# Patient Record
Sex: FEMALE | Race: OTHER | HISPANIC OR LATINO | Employment: UNEMPLOYED | URBAN - METROPOLITAN AREA
[De-identification: names, ages, dates, MRNs, and addresses within clinical notes are randomized per-mention and may not be internally consistent; named-entity substitution may affect disease eponyms.]

---
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
## 2022-01-09 ENCOUNTER — OFFICE VISIT (OUTPATIENT)
Dept: URGENT CARE | Age: 22
End: 2022-01-09
Payer: MEDICAID

## 2022-01-09 VITALS
DIASTOLIC BLOOD PRESSURE: 62 MMHG | HEART RATE: 92 BPM | RESPIRATION RATE: 18 BRPM | SYSTOLIC BLOOD PRESSURE: 132 MMHG | TEMPERATURE: 97.2 F | OXYGEN SATURATION: 99 %

## 2022-01-09 DIAGNOSIS — R31.9 URINARY TRACT INFECTION WITH HEMATURIA, SITE UNSPECIFIED: Primary | ICD-10-CM

## 2022-01-09 DIAGNOSIS — N39.0 URINARY TRACT INFECTION WITH HEMATURIA, SITE UNSPECIFIED: Primary | ICD-10-CM

## 2022-01-09 LAB
SL AMB  POCT GLUCOSE, UA: NEGATIVE
SL AMB LEUKOCYTE ESTERASE,UA: ABNORMAL
SL AMB POCT BILIRUBIN,UA: NEGATIVE
SL AMB POCT BLOOD,UA: ABNORMAL
SL AMB POCT CLARITY,UA: ABNORMAL
SL AMB POCT COLOR,UA: YELLOW
SL AMB POCT KETONES,UA: NEGATIVE
SL AMB POCT NITRITE,UA: NEGATIVE
SL AMB POCT PH,UA: 6
SL AMB POCT SPECIFIC GRAVITY,UA: 1.01
SL AMB POCT URINE PROTEIN: NEGATIVE
SL AMB POCT UROBILINOGEN: 0.2

## 2022-01-09 PROCEDURE — 87186 SC STD MICRODIL/AGAR DIL: CPT | Performed by: PHYSICIAN ASSISTANT

## 2022-01-09 PROCEDURE — 81002 URINALYSIS NONAUTO W/O SCOPE: CPT | Performed by: PHYSICIAN ASSISTANT

## 2022-01-09 PROCEDURE — 87077 CULTURE AEROBIC IDENTIFY: CPT | Performed by: PHYSICIAN ASSISTANT

## 2022-01-09 PROCEDURE — 99213 OFFICE O/P EST LOW 20 MIN: CPT | Performed by: PHYSICIAN ASSISTANT

## 2022-01-09 PROCEDURE — 87086 URINE CULTURE/COLONY COUNT: CPT | Performed by: PHYSICIAN ASSISTANT

## 2022-01-09 RX ORDER — SULFAMETHOXAZOLE AND TRIMETHOPRIM 800; 160 MG/1; MG/1
1 TABLET ORAL EVERY 12 HOURS SCHEDULED
Qty: 10 TABLET | Refills: 0 | Status: SHIPPED | OUTPATIENT
Start: 2022-01-09 | End: 2022-01-14

## 2022-01-09 NOTE — PROGRESS NOTES
[facility]        NAME: Mariama Gifford is a 25 y o  female  : 2000    MRN: [id number]  DATE: 2022  TIME: 4:58 PM    Assessment and Plan   Urinary tract infection with hematuria, site unspecified [N39 0, R31 9]  1  Urinary tract infection with hematuria, site unspecified  POCT urine dip    Urine culture    sulfamethoxazole-trimethoprim (BACTRIM DS) 800-160 mg per tablet         Patient Instructions       Follow up with PCP in 3-5 days  Proceed to  ER if symptoms worsen  Chief Complaint     Chief Complaint   Patient presents with    Possible UTI     c/o burning with urination, and frequency          History of Present Illness       For evaluation of urinary burning, pressure, frequency  Review of Systems   Review of Systems   Constitutional: Negative  Gastrointestinal: Negative  Genitourinary: Positive for dysuria, frequency and urgency  Negative for decreased urine volume, difficulty urinating, flank pain, hematuria, pelvic pain and vaginal pain           Current Medications       Current Outpatient Medications:     docusate sodium (COLACE) 100 mg capsule, Take 1 capsule (100 mg total) by mouth 2 (two) times a day (Patient not taking: Reported on 10/29/2021), Disp: 60 capsule, Rfl: 0    etonogestrel (Nexplanon) subdermal implant, 68 mg by Subdermal route once, Disp: , Rfl:     Glycerin, Laxative, ([employer] Glycerin Adult) 2 1 g SUPP, Insert 1 suppository (2 1 g total) into the rectum daily as needed (constipation) (Patient not taking: Reported on 10/29/2021), Disp: 25 suppository, Rfl: 0    norethindrone-ethinyl estradiol-iron (MICROGESTIN FE1 ) 1 5-30 MG-MCG tablet, Take 1 tablet by mouth daily for 28 days, Disp: 28 tablet, Rfl: 1    polyethylene glycol (GLYCOLAX) 17 GM/SCOOP powder, Take 17 g by mouth daily (Patient not taking: Reported on 10/29/2021), Disp: 576 g, Rfl: 0    sulfamethoxazole-trimethoprim (BACTRIM DS) 800-160 mg per tablet, Take 1 tablet by mouth every 12 (twelve) hours for 5 days, Disp: 10 tablet, Rfl: 0    Current Allergies     Allergies as of 01/09/2022    (No Known Allergies)            The following portions of the patient's history were reviewed and updated as appropriate: allergies, current medications, past family history, past medical history, past social history, past surgical history and problem list      History reviewed  No pertinent past medical history  Past Surgical History:   Procedure Laterality Date    WISDOM TOOTH EXTRACTION         Family History   Problem Relation Age of Onset    Diabetes Mother     No Known Problems Father          Medications have been verified  Objective   /62   Pulse 92   Temp (!) 97 2 °F (36 2 °C)   Resp 18   LMP 12/25/2021 (Approximate)   SpO2 99%   Patient's last menstrual period was 12/25/2021 (approximate)  Physical Exam     Physical Exam  Vitals and nursing note reviewed  Constitutional:       General: She is not in acute distress  Appearance: She is well-developed  She is not diaphoretic  HENT:      Head: Normocephalic and atraumatic  Abdominal:      General: Bowel sounds are normal  There is no distension  Palpations: Abdomen is soft  There is no mass  Tenderness: There is no abdominal tenderness  There is no right CVA tenderness, left CVA tenderness, guarding or rebound  Skin:     General: Skin is warm and dry  Findings: No rash  Neurological:      Mental Status: She is alert and oriented to person, place, and time  Psychiatric:         Behavior: Behavior normal          Thought Content:  Thought content normal          Judgment: Judgment normal

## 2022-01-09 NOTE — PATIENT INSTRUCTIONS
1  Drink plenty fluids  2   Take probiotics [i e  Yogurt, Acidophilus, Florastor (liquid)] daily  3   Over-the-counter medications as needed for symptomatic care  4    Advance activities as tolerated  5    Follow-up with your primary care physician in 3-4 days  6   Go to emergency room if symptoms are worsening      7   Use a humidifier at bedtime

## 2022-01-11 LAB — BACTERIA UR CULT: ABNORMAL

## 2022-01-13 ENCOUNTER — TELEPHONE (OUTPATIENT)
Dept: URGENT CARE | Facility: MEDICAL CENTER | Age: 22
End: 2022-01-13

## 2022-01-13 DIAGNOSIS — N39.0 URINARY TRACT INFECTION WITH HEMATURIA, SITE UNSPECIFIED: Primary | ICD-10-CM

## 2022-01-13 DIAGNOSIS — R31.9 URINARY TRACT INFECTION WITH HEMATURIA, SITE UNSPECIFIED: Primary | ICD-10-CM

## 2022-01-13 RX ORDER — NITROFURANTOIN 25; 75 MG/1; MG/1
100 CAPSULE ORAL 2 TIMES DAILY
Qty: 14 CAPSULE | Refills: 0 | Status: SHIPPED | OUTPATIENT
Start: 2022-01-13 | End: 2022-01-20

## 2022-01-14 ENCOUNTER — TELEPHONE (OUTPATIENT)
Dept: URGENT CARE | Facility: MEDICAL CENTER | Age: 22
End: 2022-01-14

## 2022-03-14 ENCOUNTER — OFFICE VISIT (OUTPATIENT)
Dept: URGENT CARE | Age: 22
End: 2022-03-14
Payer: MEDICAID

## 2022-03-14 VITALS — TEMPERATURE: 97.6 F | RESPIRATION RATE: 20 BRPM | HEART RATE: 81 BPM | OXYGEN SATURATION: 97 %

## 2022-03-14 DIAGNOSIS — R05.9 COUGH: Primary | ICD-10-CM

## 2022-03-14 PROCEDURE — 99213 OFFICE O/P EST LOW 20 MIN: CPT

## 2022-03-14 RX ORDER — BROMPHENIRAMINE MALEATE, PSEUDOEPHEDRINE HYDROCHLORIDE, AND DEXTROMETHORPHAN HYDROBROMIDE 2; 30; 10 MG/5ML; MG/5ML; MG/5ML
5 SYRUP ORAL 3 TIMES DAILY PRN
Qty: 120 ML | Refills: 0 | Status: SHIPPED | OUTPATIENT
Start: 2022-03-14 | End: 2022-07-27

## 2022-03-14 RX ORDER — FLUTICASONE PROPIONATE 50 MCG
1 SPRAY, SUSPENSION (ML) NASAL DAILY
Qty: 11.1 ML | Refills: 0 | Status: SHIPPED | OUTPATIENT
Start: 2022-03-14 | End: 2022-07-27

## 2022-03-14 NOTE — LETTER
April 1, 2022     Patient: Jose Trujillo   YOB: 2000   Date of Visit: 3/14/2022       To Whom it May Concern:    Ivon Braden was seen in my clinic on 3/14/2022  She may return to school/work on 3/16/22  If you have any questions or concerns, please don't hesitate to call           Sincerely,          JACOB Miguel        CC: No Recipients

## 2022-03-15 NOTE — PATIENT INSTRUCTIONS
Acute Cough   WHAT YOU NEED TO KNOW:   An acute cough can last up to 3 weeks  Common causes of an acute cough include a cold, allergies, or a lung infection  DISCHARGE INSTRUCTIONS:   Return to the emergency department if:   · You have trouble breathing or feel short of breath  · You cough up blood, or you see blood in your mucus  · You faint or feel weak or dizzy  · You have chest pain when you cough or take a deep breath  · You have new wheezing  Contact your healthcare provider if:   · You have a fever  · Your cough lasts longer than 4 weeks  · Your symptoms do not improve with treatment  · You have questions or concerns about your condition or care  Medicines:   · Medicines  may be needed to stop the cough, decrease swelling in your airways, or help open your airways  Medicine may also be given to help you cough up mucus  Ask your healthcare provider what over-the-counter medicines you can take  If you have an infection caused by bacteria, you may need antibiotics  · Take your medicine as directed  Contact your healthcare provider if you think your medicine is not helping or if you have side effects  Tell him or her if you are allergic to any medicine  Keep a list of the medicines, vitamins, and herbs you take  Include the amounts, and when and why you take them  Bring the list or the pill bottles to follow-up visits  Carry your medicine list with you in case of an emergency  Manage your symptoms:   · Do not smoke and stay away from others who smoke  Nicotine and other chemicals in cigarettes and cigars can cause lung damage and make your cough worse  Ask your healthcare provider for information if you currently smoke and need help to quit  E-cigarettes or smokeless tobacco still contain nicotine  Talk to your healthcare provider before you use these products  · Drink extra liquids as directed  Liquids will help thin and loosen mucus so you can cough it up   Liquids will also help prevent dehydration  Examples of good liquids to drink include water, fruit juice, and broth  Do not drink liquids that contain caffeine  Caffeine can increase your risk for dehydration  Ask your healthcare provider how much liquid to drink each day  · Rest as directed  Do not do activities that make your cough worse, such as exercise  · Use a humidifier or vaporizer  Use a cool mist humidifier or a vaporizer to increase air moisture in your home  This may make it easier for you to breathe and help decrease your cough  · Eat 2 to 5 mL of honey 2 times each day  Honey can help thin mucus and decrease your cough  · Use cough drops or lozenges  These can help decrease throat irritation and your cough  Follow up with your healthcare provider as directed:  Write down your questions so you remember to ask them during your visits  © Copyright [employer] 2022 Information is for End User's use only and may not be sold, redistributed or otherwise used for commercial purposes  All illustrations and images included in CareNotes® are the copyrighted property of A D A M , Inc  or [facility] Jemma Nichols   The above information is an  only  It is not intended as medical advice for individual conditions or treatments  Talk to your doctor, nurse or pharmacist before following any medical regimen to see if it is safe and effective for you

## 2022-03-15 NOTE — PROGRESS NOTES
[facility] Now        NAME: Mariama Gifford is a 25 y o  female  : 2000    MRN: [id number]  DATE: 2022  TIME: 11:08 PM    Assessment and Plan   Cough [R05 9]  1  Cough  fluticasone (FLONASE) 50 mcg/act nasal spray    brompheniramine-pseudoephedrine-DM 30-2-10 MG/5ML syrup   Rapid strep A negative, likely viral etiology for symptoms at this point  Will trial Bromfed and Flonase for symptom relief  Patient Instructions   If fever, SOB, chest pain, proceed to ED for evaluation  Follow up with PCP in 3-5 days  Proceed to  ER if symptoms worsen  Chief Complaint     Chief Complaint   Patient presents with    Cough     chest tightness , symptoms x friday     Nasal Congestion    Back Pain         History of Present Illness       Patient is a 25 y o  female who presents for chief complaint of cough and nasal congestion since this past Friday  PMH is non-contributory, she has tried nothing for her symptoms  In addition, she reports a sore throat which began about two weeks ago  Initially, she had noticed her tonsils had "white stuff on them", and she feels she likely had a strep infection  Symptoms subsided to some degree, until her present symptoms of cough and congestion began  Denies fever, body aches, rash, abdominal pain, N/V/D, dizziness  Cough  Associated symptoms include headaches, postnasal drip and a sore throat  Pertinent negatives include no chest pain, chills, eye redness, fever, rhinorrhea or shortness of breath  There is no history of environmental allergies  Back Pain  Associated symptoms include headaches  Pertinent negatives include no abdominal pain, chest pain, fever or numbness  Review of Systems   Review of Systems   Constitutional: Negative for activity change, appetite change, chills, diaphoresis, fatigue and fever  HENT: Positive for congestion, postnasal drip and sore throat   Negative for rhinorrhea, sinus pressure, sinus pain and sneezing  Eyes: Negative for pain, discharge, redness and itching  Respiratory: Positive for cough  Negative for choking, chest tightness and shortness of breath  Cardiovascular: Negative for chest pain  Gastrointestinal: Positive for nausea  Negative for abdominal pain and diarrhea  Endocrine: Negative  Negative for polydipsia, polyphagia and polyuria  Genitourinary: Negative  Musculoskeletal: Positive for back pain  Negative for arthralgias  Skin: Negative  Allergic/Immunologic: Negative for environmental allergies  Neurological: Positive for headaches  Negative for dizziness, light-headedness and numbness  Hematological: Negative for adenopathy  Psychiatric/Behavioral: Negative            Current Medications       Current Outpatient Medications:     brompheniramine-pseudoephedrine-DM 30-2-10 MG/5ML syrup, Take 5 mL by mouth 3 (three) times a day as needed for congestion, cough or allergies, Disp: 120 mL, Rfl: 0    docusate sodium (COLACE) 100 mg capsule, Take 1 capsule (100 mg total) by mouth 2 (two) times a day (Patient not taking: Reported on 10/29/2021), Disp: 60 capsule, Rfl: 0    etonogestrel (Nexplanon) subdermal implant, 68 mg by Subdermal route once, Disp: , Rfl:     fluticasone (FLONASE) 50 mcg/act nasal spray, 1 spray into each nostril daily, Disp: 11 1 mL, Rfl: 0    Glycerin, Laxative, ([employer] Glycerin Adult) 2 1 g SUPP, Insert 1 suppository (2 1 g total) into the rectum daily as needed (constipation) (Patient not taking: Reported on 10/29/2021), Disp: 25 suppository, Rfl: 0    norethindrone-ethinyl estradiol-iron (MICROGESTIN FE1 5/30) 1 5-30 MG-MCG tablet, Take 1 tablet by mouth daily for 28 days, Disp: 28 tablet, Rfl: 1    polyethylene glycol (GLYCOLAX) 17 GM/SCOOP powder, Take 17 g by mouth daily (Patient not taking: Reported on 10/29/2021), Disp: 576 g, Rfl: 0    Current Allergies     Allergies as of 03/14/2022    (No Known Allergies)            The following portions of the patient's history were reviewed and updated as appropriate: allergies, current medications, past family history, past medical history, past social history, past surgical history and problem list      History reviewed  No pertinent past medical history  Past Surgical History:   Procedure Laterality Date    WISDOM TOOTH EXTRACTION         Family History   Problem Relation Age of Onset    Diabetes Mother     No Known Problems Father          Medications have been verified  Objective   Pulse 81   Temp 97 6 °F (36 4 °C) (Temporal)   Resp 20   LMP 12/25/2021 Comment: bcp  SpO2 97%        Physical Exam     Physical Exam  Vitals and nursing note reviewed  Constitutional:       General: She is not in acute distress  Appearance: Normal appearance  She is not ill-appearing  HENT:      Head: Normocephalic and atraumatic  Right Ear: Tympanic membrane normal       Left Ear: Tympanic membrane normal       Nose: Congestion and rhinorrhea present  Mouth/Throat:      Mouth: Mucous membranes are dry  Pharynx: Oropharynx is clear  Uvula midline  Posterior oropharyngeal erythema present  No pharyngeal swelling, oropharyngeal exudate or uvula swelling  Tonsils: No tonsillar exudate or tonsillar abscesses  3+ on the right  3+ on the left  Eyes:      Extraocular Movements: Extraocular movements intact  Conjunctiva/sclera: Conjunctivae normal       Pupils: Pupils are equal, round, and reactive to light  Cardiovascular:      Rate and Rhythm: Normal rate and regular rhythm  Pulses: Normal pulses  Heart sounds: Normal heart sounds, S1 normal and S2 normal  No murmur heard  No friction rub  No gallop  Pulmonary:      Effort: Pulmonary effort is normal  No tachypnea, bradypnea, accessory muscle usage, prolonged expiration or respiratory distress  Breath sounds: Normal breath sounds and air entry   No stridor, decreased air movement or transmitted upper airway sounds  No decreased breath sounds, wheezing, rhonchi or rales  Abdominal:      General: Bowel sounds are normal       Palpations: Abdomen is soft  Tenderness: There is no abdominal tenderness  There is no guarding or rebound  Musculoskeletal:         General: Normal range of motion  Cervical back: Normal range of motion and neck supple  No tenderness  Skin:     General: Skin is warm and dry  Capillary Refill: Capillary refill takes less than 2 seconds  Neurological:      General: No focal deficit present  Mental Status: She is alert and oriented to person, place, and time  Cranial Nerves: No cranial nerve deficit     Psychiatric:         Mood and Affect: Mood normal          Behavior: Behavior normal

## 2022-03-17 ENCOUNTER — APPOINTMENT (OUTPATIENT)
Dept: URGENT CARE | Facility: CLINIC | Age: 22
End: 2022-03-17

## 2022-03-17 DIAGNOSIS — Z02.1 PRE-EMPLOYMENT HEALTH SCREENING EXAMINATION: Primary | ICD-10-CM

## 2022-03-17 PROCEDURE — 86480 TB TEST CELL IMMUN MEASURE: CPT

## 2022-03-21 LAB
GAMMA INTERFERON BACKGROUND BLD IA-ACNC: 0.08 IU/ML
M TB IFN-G BLD-IMP: NEGATIVE
M TB IFN-G CD4+ BCKGRND COR BLD-ACNC: 0.1 IU/ML
M TB IFN-G CD4+ BCKGRND COR BLD-ACNC: 0.16 IU/ML
MITOGEN IGNF BCKGRD COR BLD-ACNC: 9.79 IU/ML

## 2022-03-29 NOTE — TELEPHONE ENCOUNTER
03/28/22 11:04 PM     Thank you for your request  Your request has been received, reviewed, and the patient chart updated  The PCP has successfully been removed with a patient attribution note  This message will now be completed      Thank you  Eunice Sheehan

## 2022-04-02 ENCOUNTER — OFFICE VISIT (OUTPATIENT)
Dept: URGENT CARE | Age: 22
End: 2022-04-02
Payer: MEDICAID

## 2022-04-02 DIAGNOSIS — T78.40XA ALLERGIC REACTION, INITIAL ENCOUNTER: Primary | ICD-10-CM

## 2022-04-02 PROCEDURE — 99213 OFFICE O/P EST LOW 20 MIN: CPT | Performed by: STUDENT IN AN ORGANIZED HEALTH CARE EDUCATION/TRAINING PROGRAM

## 2022-04-02 RX ORDER — PREDNISONE 10 MG/1
10 TABLET ORAL DAILY
Qty: 21 TABLET | Refills: 0 | Status: SHIPPED | OUTPATIENT
Start: 2022-04-02 | End: 2022-07-27

## 2022-04-02 NOTE — PROGRESS NOTES
[facility]        NAME: Dulce Duran is a 25 y o  female  : 2000    MRN: [id number]  DATE: 2022  TIME: 7:19 PM    Assessment and Plan   Allergic reaction, initial encounter Po Jacobs  1  Allergic reaction, initial encounter  predniSONE 10 mg tablet     Follow-up with ER as your OB doctor is in [facility], by Wednesday no improvement or worsening or any chest pain or shortness of breath    Patient Instructions       Follow up with PCP in 3-5 days  Proceed to  ER if symptoms worsen  Chief Complaint   No chief complaint on file  History of Present Illness       HPI   Patient presents today itching and erythema your surgeon side which she got a  and   The symptoms started on Tuesday she did not think of anything of states that it has gradually been resolving  Patient would just like to make sure it is nothing serious  Patient denies any chest pain shortness of breath pain in her arm      Review of Systems   Review of Systems  Per hpi     Current Medications       Current Outpatient Medications:     brompheniramine-pseudoephedrine-DM 30-2-10 MG/5ML syrup, Take 5 mL by mouth 3 (three) times a day as needed for congestion, cough or allergies, Disp: 120 mL, Rfl: 0    docusate sodium (COLACE) 100 mg capsule, Take 1 capsule (100 mg total) by mouth 2 (two) times a day (Patient not taking: Reported on 10/29/2021), Disp: 60 capsule, Rfl: 0    etonogestrel (Nexplanon) subdermal implant, 68 mg by Subdermal route once, Disp: , Rfl:     fluticasone (FLONASE) 50 mcg/act nasal spray, 1 spray into each nostril daily, Disp: 11 1 mL, Rfl: 0    Glycerin, Laxative, ([employer] Glycerin Adult) 2 1 g SUPP, Insert 1 suppository (2 1 g total) into the rectum daily as needed (constipation) (Patient not taking: Reported on 10/29/2021), Disp: 25 suppository, Rfl: 0    norethindrone-ethinyl estradiol-iron (MICROGESTIN FE1 ) 1 5-30 MG-MCG tablet, Take 1 tablet by mouth daily for 28 days, Disp: 28 tablet, Rfl: 1    polyethylene glycol (GLYCOLAX) 17 GM/SCOOP powder, Take 17 g by mouth daily (Patient not taking: Reported on 10/29/2021), Disp: 576 g, Rfl: 0    predniSONE 10 mg tablet, Take 1 tablet (10 mg total) by mouth daily 6 tab day 1, 5 tab day 2, 4 tab day 3, 3 tab day 4, 2 tab day 5, 1 tab day 6, Disp: 21 tablet, Rfl: 0    Current Allergies     Allergies as of 04/02/2022    (No Known Allergies)            The following portions of the patient's history were reviewed and updated as appropriate: allergies, current medications, past family history, past medical history, past social history, past surgical history and problem list      No past medical history on file  Past Surgical History:   Procedure Laterality Date    WISDOM TOOTH EXTRACTION         Family History   Problem Relation Age of Onset    Diabetes Mother     No Known Problems Father          Medications have been verified  Objective   There were no vitals taken for this visit  No LMP recorded  Physical Exam     Physical Exam  Constitutional:       General: She is not in acute distress  Appearance: Normal appearance  HENT:      Head: Normocephalic  Nose: No congestion or rhinorrhea  Mouth/Throat:      Mouth: Mucous membranes are moist       Pharynx: No oropharyngeal exudate or posterior oropharyngeal erythema  Eyes:      General:         Right eye: No discharge  Left eye: No discharge  Conjunctiva/sclera: Conjunctivae normal    Cardiovascular:      Rate and Rhythm: Normal rate and regular rhythm  Pulses: Normal pulses  Pulmonary:      Effort: Pulmonary effort is normal  No respiratory distress  Abdominal:      General: Abdomen is flat  There is no distension  Palpations: Abdomen is soft  Tenderness: There is no abdominal tenderness  Musculoskeletal:      Cervical back: Neck supple  Skin:     General: Skin is warm        Capillary Refill: Capillary refill takes less than 2 seconds  Findings: Erythema present  Comments: Slight erythema overlying Nexplanon insertion site, resolving compared to picture from 2 days ago  Neurological:      Mental Status: She is alert and oriented to person, place, and time

## 2022-06-20 ENCOUNTER — OFFICE VISIT (OUTPATIENT)
Dept: FAMILY MEDICINE CLINIC | Facility: CLINIC | Age: 22
End: 2022-06-20
Payer: COMMERCIAL

## 2022-06-20 VITALS
RESPIRATION RATE: 16 BRPM | DIASTOLIC BLOOD PRESSURE: 78 MMHG | BODY MASS INDEX: 31.09 KG/M2 | HEART RATE: 86 BPM | WEIGHT: 186.6 LBS | TEMPERATURE: 99.4 F | HEIGHT: 65 IN | SYSTOLIC BLOOD PRESSURE: 120 MMHG | OXYGEN SATURATION: 98 %

## 2022-06-20 DIAGNOSIS — B35.3 TINEA PEDIS OF BOTH FEET: Primary | ICD-10-CM

## 2022-06-20 PROCEDURE — 99214 OFFICE O/P EST MOD 30 MIN: CPT | Performed by: FAMILY MEDICINE

## 2022-06-20 NOTE — PROGRESS NOTES
Assessment/Plan:    No problem-specific Assessment & Plan notes found for this encounter  Diagnoses and all orders for this visit:    Tinea pedis of both feet  Comments:  Continue clotrimazole bid and continue for a week after rash resolved  Return if no improvement in 2-4 weeks  Orders:  -     Clotrimazole 1 % OINT; Apply topically 2 (two) times a day        Subjective: foot fungus     Patient ID: Tiki President is a 25 y o  female  Rash  This is a chronic problem  The current episode started more than 1 month ago  The problem has been gradually worsening since onset  The affected locations include the left foot and right foot  The rash is characterized by redness, itchiness and peeling  She was exposed to nothing  Pertinent negatives include no anorexia, congestion, cough, diarrhea, facial edema, fatigue, fever, joint pain, rhinorrhea, shortness of breath, sore throat or vomiting  Symptoms started a couple months ago with peeling of the bilateral toes in the interdigital webbing  She usually wears constricting shoes  She tried aquafor, a barrier cream and coconut oil  The following portions of the patient's history were reviewed and updated as appropriate: allergies, current medications, past family history, past medical history, past social history, past surgical history and problem list     Review of Systems   Constitutional: Negative for fatigue and fever  HENT: Negative for congestion, rhinorrhea and sore throat  Respiratory: Negative for cough and shortness of breath  Gastrointestinal: Negative for anorexia, diarrhea and vomiting  Musculoskeletal: Negative for joint pain  Skin: Positive for rash           PHQ-2/9 Depression Screening    Little interest or pleasure in doing things: 1 - several days  Feeling down, depressed, or hopeless: 0 - not at all       [unfilled]    Objective:      /78 (BP Location: Left arm, Patient Position: Sitting, Cuff Size: Adult)   Pulse 86   Temp 99 4 °F (37 4 °C) (Tympanic)   Resp 16   Ht 5' 5" (1 651 m)   Wt 84 6 kg (186 lb 9 6 oz)   SpO2 98%   BMI 31 05 kg/m²          Physical Exam  Constitutional:       Appearance: She is well-developed  HENT:      Head: Normocephalic and atraumatic  Right Ear: External ear normal       Left Ear: External ear normal    Eyes:      General: No scleral icterus  Conjunctiva/sclera: Conjunctivae normal    Pulmonary:      Effort: Pulmonary effort is normal  No respiratory distress  Musculoskeletal:      Cervical back: Normal range of motion  Skin:     Findings: Rash (desquamation of the bilateral interdigital areas) present  Neurological:      Mental Status: She is alert and oriented to person, place, and time  Answers for HPI/ROS submitted by the patient on 6/20/2022  nail changes:  No

## 2022-06-20 NOTE — LETTER
June 20, 2022     Patient: Nigel Sicard  YOB: 2000  Date of Visit: 6/20/2022      To Whom it May Concern:    Renetta Greene is under my professional care  Davidxi Erik was seen in my office on 6/20/2022  Drewnexi Valencia may return to school on 6/21/22  If you have any questions or concerns, please don't hesitate to call           Sincerely,          Camelia Lane MD        CC: No Recipients

## 2022-06-27 ENCOUNTER — TELEPHONE (OUTPATIENT)
Dept: FAMILY MEDICINE CLINIC | Facility: CLINIC | Age: 22
End: 2022-06-27

## 2022-06-27 NOTE — TELEPHONE ENCOUNTER
[facility] called, the Clotrimazole ointment that was prescribed has been discontinued but they do have the Clotrimazole cream  Can this be changed to the cream ?

## 2022-06-28 DIAGNOSIS — B35.3 TINEA PEDIS OF BOTH FEET: Primary | ICD-10-CM

## 2022-06-28 RX ORDER — CLOTRIMAZOLE 1 %
CREAM (GRAM) TOPICAL 2 TIMES DAILY
Qty: 30 G | Refills: 0 | Status: SHIPPED | OUTPATIENT
Start: 2022-06-28

## 2022-07-27 ENCOUNTER — OFFICE VISIT (OUTPATIENT)
Dept: OBGYN CLINIC | Facility: CLINIC | Age: 22
End: 2022-07-27
Payer: COMMERCIAL

## 2022-07-27 VITALS
WEIGHT: 191.2 LBS | BODY MASS INDEX: 31.86 KG/M2 | HEIGHT: 65 IN | DIASTOLIC BLOOD PRESSURE: 82 MMHG | SYSTOLIC BLOOD PRESSURE: 116 MMHG

## 2022-07-27 DIAGNOSIS — Z11.3 SCREENING EXAMINATION FOR STD (SEXUALLY TRANSMITTED DISEASE): Primary | ICD-10-CM

## 2022-07-27 PROCEDURE — 87491 CHLMYD TRACH DNA AMP PROBE: CPT | Performed by: NURSE PRACTITIONER

## 2022-07-27 PROCEDURE — 99213 OFFICE O/P EST LOW 20 MIN: CPT | Performed by: NURSE PRACTITIONER

## 2022-07-27 PROCEDURE — 87591 N.GONORRHOEAE DNA AMP PROB: CPT | Performed by: NURSE PRACTITIONER

## 2022-07-27 NOTE — PROGRESS NOTES
Silva Jacome 25year-old presents for sexually transmitted disease check  Sexual history reviewed with the patient  STD exposure: sexual contact with individual with uncertain background  Patient reports to monogamous relationship  Patient denies any vaginal discharge or symptoms at this time  Previous history of STD:  chlamydia  Current symptoms include none  Contraception: Nexplanon  Patient reports to have had it placed last June in [place]  Patient's last menstrual period was 07/26/2022  Pt reports to be lightly bleeding currently  States that periods intermittently come on current contraception method     Visit Vitals  /82   Ht 5' 5" (1 651 m)   Wt 86 7 kg (191 lb 3 2 oz)   BMI 31 82 kg/m²   OB Status Birth Control   Smoking Status Never Smoker   BSA 1 94 m²       ROS:  As indicated in HPI  All other ROS negative  Physical Exam  Constitutional:       Appearance: Normal appearance  Genitourinary:      Right Labia: No rash, tenderness, lesions, skin changes or Bartholin's cyst      Left Labia: No tenderness, lesions, skin changes, Bartholin's cyst or rash  No labial fusion noted  No vaginal discharge, tenderness, bleeding or granulation tissue  No vaginal prolapse present  No vaginal atrophy present  Cervix is nulliparous  No cervical discharge, lesion or nabothian cyst    HENT:      Head: Normocephalic and atraumatic  Musculoskeletal:      Cervical back: Neck supple  Neurological:      Mental Status: She is alert  Skin:     General: Skin is warm  Psychiatric:         Behavior: Behavior is cooperative  Vitals and nursing note reviewed  Calderontiago Gaytandebbi was seen today for std screening  Diagnoses and all orders for this visit:    Screening examination for STD (sexually transmitted disease)  -     Chlamydia/GC amplified DNA by PCR      Results will be released to [facility], if abnormal will call to review and discuss treatment plan

## 2022-07-28 LAB
C TRACH DNA SPEC QL NAA+PROBE: NEGATIVE
N GONORRHOEA DNA SPEC QL NAA+PROBE: NEGATIVE

## 2023-01-27 ENCOUNTER — OFFICE VISIT (OUTPATIENT)
Dept: URGENT CARE | Age: 23
End: 2023-01-27

## 2023-01-27 VITALS
SYSTOLIC BLOOD PRESSURE: 102 MMHG | OXYGEN SATURATION: 99 % | WEIGHT: 187 LBS | HEART RATE: 73 BPM | BODY MASS INDEX: 31.12 KG/M2 | DIASTOLIC BLOOD PRESSURE: 80 MMHG | TEMPERATURE: 97.5 F | RESPIRATION RATE: 20 BRPM

## 2023-01-27 DIAGNOSIS — H66.91 RIGHT OTITIS MEDIA, UNSPECIFIED OTITIS MEDIA TYPE: Primary | ICD-10-CM

## 2023-01-27 RX ORDER — AMOXICILLIN AND CLAVULANATE POTASSIUM 875; 125 MG/1; MG/1
1 TABLET, FILM COATED ORAL EVERY 12 HOURS SCHEDULED
Qty: 20 TABLET | Refills: 0 | Status: SHIPPED | OUTPATIENT
Start: 2023-01-27 | End: 2023-02-06

## 2023-01-27 NOTE — PATIENT INSTRUCTIONS
Please take Augmentin every 12 hours for the next 10 days  1   Drink plenty fluids  2   Take probiotics [i e  Yogurt, Acidophilus, Florastor (liquid)] daily  3   Over-the-counter medications as needed for symptomatic care  4    Advance activities as tolerated  5    Follow-up with your primary care physician in 3-4 days  6   Go to emergency room if symptoms are worsening      7   Use a humidifier at bedtime

## 2023-01-27 NOTE — PROGRESS NOTES
[facility] Now        NAME: Maria De Jesus Bolaños is a 23 y o  female  : 2000    MRN: [id number]  DATE: 2023  TIME: 3:16 PM    Assessment and Plan   Right otitis media, unspecified otitis media type [H66 91]  1  Right otitis media, unspecified otitis media type  amoxicillin-clavulanate (AUGMENTIN) 875-125 mg per tablet            Patient Instructions     Antibiotics twice daily x10 days  Follow up with PCP in 3-5 days  Proceed to  ER if symptoms worsen  Chief Complaint     Chief Complaint   Patient presents with   • Earache     Right ear pain for 3 days  History of Present Illness       Patient presenting for evaluation of right sided ear pain that began 3 days ago  She denies any bleeding or drainage from her ear, but states that her ear has felt warm and red on the inside and outside  She denies any decreased hearing  She denies any fevers or chills  She denies any recent ear infections  She denies any current treatment for her symptoms  Review of Systems   Review of Systems   Constitutional: Negative for chills and fever  HENT: Positive for ear pain  Negative for ear discharge  Respiratory: Negative for cough  Cardiovascular: Negative for chest pain  Gastrointestinal: Negative for diarrhea, nausea and vomiting  Skin: Positive for color change (Erythema )  All other systems reviewed and are negative          Current Medications       Current Outpatient Medications:   •  amoxicillin-clavulanate (AUGMENTIN) 875-125 mg per tablet, Take 1 tablet by mouth every 12 (twelve) hours for 10 days, Disp: 20 tablet, Rfl: 0  •  clotrimazole (Clotrimazole Anti-Fungal) 1 % cream, Apply topically 2 (two) times a day, Disp: 30 g, Rfl: 0  •  etonogestrel (Nexplanon) subdermal implant, 68 mg by Subdermal route once, Disp: , Rfl:     Current Allergies     Allergies as of 2023   • (No Known Allergies)            The following portions of the patient's history were reviewed and updated as appropriate: allergies, current medications, past family history, past medical history, past social history, past surgical history and problem list      History reviewed  No pertinent past medical history  Past Surgical History:   Procedure Laterality Date   • WISDOM TOOTH EXTRACTION         Family History   Problem Relation Age of Onset   • Diabetes Mother    • No Known Problems Father          Medications have been verified  Objective   /80   Pulse 73   Temp 97 5 °F (36 4 °C) (Temporal)   Resp 20   Wt 84 8 kg (187 lb)   LMP 01/09/2023 (Approximate)   SpO2 99%   BMI 31 12 kg/m²        Physical Exam     Physical Exam  Vitals and nursing note reviewed  Constitutional:       General: She is not in acute distress  Appearance: Normal appearance  She is normal weight  She is not ill-appearing, toxic-appearing or diaphoretic  HENT:      Head: Normocephalic and atraumatic  Right Ear: Tenderness present  Tympanic membrane is erythematous and bulging  Left Ear: Tympanic membrane normal       Nose: Nose normal  No congestion or rhinorrhea  Mouth/Throat:      Mouth: Mucous membranes are moist       Pharynx: Oropharynx is clear  No oropharyngeal exudate or posterior oropharyngeal erythema  Eyes:      General:         Right eye: No discharge  Left eye: No discharge  Cardiovascular:      Rate and Rhythm: Normal rate and regular rhythm  Pulses: Normal pulses  Heart sounds: Normal heart sounds  No murmur heard  No friction rub  No gallop  Pulmonary:      Effort: Pulmonary effort is normal  No respiratory distress  Breath sounds: Normal breath sounds  No stridor  No wheezing, rhonchi or rales  Chest:      Chest wall: No tenderness  Abdominal:      General: Bowel sounds are normal       Palpations: Abdomen is soft  Tenderness: There is no abdominal tenderness  Skin:     General: Skin is warm and dry     Neurological: Mental Status: She is alert     Psychiatric:         Mood and Affect: Mood normal          Behavior: Behavior normal

## 2023-01-27 NOTE — LETTER
January 27, 2023     Patient: Pete Gonzalez   YOB: 2000   Date of Visit: 1/27/2023       To Whom it May Concern:    Johnny Lomeli was seen in my clinic on 1/27/2023  She may return to work on 1/28/2023  If you have any questions or concerns, please don't hesitate to call           Sincerely,          JACOB Aj        CC: No Recipients

## 2023-01-27 NOTE — LETTER
January 27, 2023     Patient: Tj Salinas   YOB: 2000   Date of Visit: 1/27/2023       To Whom it May Concern:    Alisha Barger was seen in my clinic on 1/27/2023  She may return to work on 1/28/2023  If you have any questions or concerns, please don't hesitate to call           Sincerely,          JACOB Mobley        CC: No Recipients

## 2023-08-13 ENCOUNTER — OFFICE VISIT (OUTPATIENT)
Dept: URGENT CARE | Age: 23
End: 2023-08-13
Payer: MEDICAID

## 2023-08-13 VITALS
BODY MASS INDEX: 31.65 KG/M2 | WEIGHT: 190 LBS | HEART RATE: 76 BPM | OXYGEN SATURATION: 97 % | HEIGHT: 65 IN | TEMPERATURE: 98.6 F

## 2023-08-13 DIAGNOSIS — H69.83 DYSFUNCTION OF BOTH EUSTACHIAN TUBES: Primary | ICD-10-CM

## 2023-08-13 PROCEDURE — 99213 OFFICE O/P EST LOW 20 MIN: CPT | Performed by: PHYSICIAN ASSISTANT

## 2023-08-13 RX ORDER — FLUTICASONE PROPIONATE 50 MCG
1 SPRAY, SUSPENSION (ML) NASAL DAILY
Start: 2023-08-13

## 2023-08-13 RX ORDER — PSEUDOEPHEDRINE HCL 120 MG/1
120 TABLET, FILM COATED, EXTENDED RELEASE ORAL 2 TIMES DAILY
Start: 2023-08-13

## 2023-08-13 NOTE — PATIENT INSTRUCTIONS
Eustachian Tube Dysfunction   WHAT YOU NEED TO KNOW:   What is eustachian tube dysfunction (ETD)? ETD is a condition that prevents your eustachian tubes from opening properly. It can also cause them to become blocked. Eustachian tubes connect your middle ear to the back of your nose and throat. These tubes open and allow air to flow in and out when you sneeze, swallow, or yawn. What causes or increases my risk for ETD? ETD may be caused by swelling or buildup of mucus in your eustachian tubes. Pressure can build if you travel in an airplane or go scuba diving. Allergies, a cold, or a sinus infection can cause mucus to build up. The following can also increase your risk:  Smoking cigarettes    GERD, chronic sinus inflammation, or a tumor in your nose or throat    An immune system disorder    Sleeping on your stomach    In children, long-term use of a bottle, going to , or a condition such as a cleft palate    What are the signs and symptoms of ETD? Fullness or pressure in your ears    Muffled hearing, or a feeling you are hearing under water or have clogged ears    Pain in one or both ears    Ringing in your ears    Popping, crackling, or clicking feeling in your ears    Trouble keeping your balance    How is ETD diagnosed? ETD is most common in children younger than 5 years. Adults with ETD may have had it since childhood. ETD can sometimes begin in adulthood, usually because of certain medical conditions that have developed. Your healthcare provider will ask about your symptoms and when they began. He or she will examine your ears, your nose, and the back of your throat. He or she may also do a hearing test.  How is ETD treated? ETD may get better on its own without any treatment. If it continues, you may need any of the following:  Swallow, yawn, or chew gum  to help open your eustachian tubes.  Your healthcare provider may also recommend you blow with your mouth shut and your nostrils pinched closed. Air pressure devices  push air into your nose and eustachian tubes to help relieve air pressure in your ear. Treatment for allergies  such as decongestants, antihistamines, and nasal steroids may improve ETD. They may help decrease swelling of the eustachian tubes. A myringotomy  is surgery to make a hole in your eardrum. The hole relieves pressure and lets fluid drain from your ear. A pressure equalizing (PE) tube may be used to keep the hole open and to help drain fluid. Tuboplasty  is a procedure to widen your eustachian tubes. When should I call my doctor? Your symptoms do not improve or get worse. You have a fever. You have any hearing loss. You have questions or concerns about your condition or care. CARE AGREEMENT:   You have the right to help plan your care. Learn about your health condition and how it may be treated. Discuss treatment options with your healthcare providers to decide what care you want to receive. You always have the right to refuse treatment. The above information is an  only. It is not intended as medical advice for individual conditions or treatments. Talk to your doctor, nurse or pharmacist before following any medical regimen to see if it is safe and effective for you. © Copyright [employer] 2022 Information is for End User's use only and may not be sold, redistributed or otherwise used for commercial purposes.

## 2023-08-13 NOTE — LETTER
August 14, 2023     Patient: Jerry Donovan   YOB: 2000   Date of Visit: 8/13/2023       To Whom it May Concern:    Davidgeraldine Majo was seen in my clinic on 8/13/2023. Please excuse illness. If you have any questions or concerns, please don't hesitate to call.          Sincerely,          Enmanuel Read PA-C        CC: No Recipients

## 2023-08-13 NOTE — LETTER
August 13, 2023     Patient: Suzi Bello   YOB: 2000   Date of Visit: 8/13/2023       To Whom It May Concern: It is my medical opinion that Dori Seen please excuse illness. If you have any questions or concerns, please don't hesitate to call.          Sincerely,        [facility]    CC: No Recipients

## 2023-08-14 NOTE — PROGRESS NOTES
[facility]        NAME: Guerda Ahn is a 23 y.o. female  : 2000    MRN: [id number]  DATE: 2023  TIME: 7:20 AM    Assessment and Plan   Dysfunction of both eustachian tubes [H69.83]  1. Dysfunction of both eustachian tubes  fluticasone (FLONASE) 50 mcg/act nasal spray    pseudoephedrine (SUDAFED) 120 MG 12 hr tablet            Patient Instructions     Use flonase as directed. When using medication chin to chest. Angle sprayer towards the ear of same nostril being sprayed. Use sudafed as directed. Take medication in morning. Follow up with PCP in 3-5 days. Proceed to  ER if symptoms worsen. Chief Complaint   No chief complaint on file. History of Present Illness       Earache   There is pain in both ears. The current episode started 1 to 4 weeks ago. The problem has been unchanged. There has been no fever. Pertinent negatives include no abdominal pain, coughing, ear discharge, headaches, hearing loss, rash, rhinorrhea, sore throat or vomiting. She has tried nothing for the symptoms. Review of Systems   Review of Systems   Constitutional: Negative for chills, fatigue and fever. HENT: Positive for ear pain. Negative for congestion, ear discharge, hearing loss, postnasal drip, rhinorrhea, sinus pressure, sinus pain and sore throat. Eyes: Negative for pain and discharge. Respiratory: Negative for cough, chest tightness and shortness of breath. Cardiovascular: Negative for chest pain. Gastrointestinal: Negative for abdominal pain, constipation, nausea and vomiting. Genitourinary: Negative for difficulty urinating. Musculoskeletal: Negative for arthralgias and myalgias. Skin: Negative for rash. Neurological: Negative for dizziness and headaches. Psychiatric/Behavioral: Negative for behavioral problems.          Current Medications       Current Outpatient Medications:   •  fluticasone (FLONASE) 50 mcg/act nasal spray, 1 spray into each nostril daily, Disp: , Rfl:   •  pseudoephedrine (SUDAFED) 120 MG 12 hr tablet, Take 1 tablet (120 mg total) by mouth 2 (two) times a day, Disp: , Rfl:   •  clotrimazole (Clotrimazole Anti-Fungal) 1 % cream, Apply topically 2 (two) times a day, Disp: 30 g, Rfl: 0  •  etonogestrel (Nexplanon) subdermal implant, 68 mg by Subdermal route once, Disp: , Rfl:     Current Allergies     Allergies as of 08/13/2023   • (No Known Allergies)            The following portions of the patient's history were reviewed and updated as appropriate: allergies, current medications, past family history, past medical history, past social history, past surgical history and problem list.     No past medical history on file. Past Surgical History:   Procedure Laterality Date   • WISDOM TOOTH EXTRACTION         Family History   Problem Relation Age of Onset   • Diabetes Mother    • No Known Problems Father          Medications have been verified. Objective   Pulse 76   Temp 98.6 °F (37 °C)   Ht 5' 5" (1.651 m)   Wt 86.2 kg (190 lb)   SpO2 97%   BMI 31.62 kg/m²   No LMP recorded. Physical Exam     Physical Exam  Vitals and nursing note reviewed. Constitutional:       Appearance: She is well-developed. HENT:      Right Ear: External ear normal. Tympanic membrane is retracted. Left Ear: Tympanic membrane and external ear normal.   Cardiovascular:      Rate and Rhythm: Normal rate and regular rhythm. Heart sounds: Normal heart sounds, S1 normal and S2 normal. No murmur heard. Pulmonary:      Effort: Pulmonary effort is normal. No respiratory distress. Breath sounds: Normal breath sounds. No wheezing or rales. Chest:      Chest wall: No tenderness. Musculoskeletal:      Cervical back: Normal range of motion. No edema. Lymphadenopathy:      Cervical: No cervical adenopathy. Skin:     General: Skin is warm and dry. Findings: No rash.    Neurological:      Mental Status: She is alert and oriented to person, place, and time.    Psychiatric:         Speech: Speech normal.         Behavior: Behavior normal.

## 2023-10-24 ENCOUNTER — HOSPITAL ENCOUNTER (EMERGENCY)
Facility: HOSPITAL | Age: 23
Discharge: HOME | End: 2023-10-24
Attending: EMERGENCY MEDICINE | Admitting: EMERGENCY MEDICINE
Payer: MEDICAID

## 2023-10-24 VITALS
HEART RATE: 92 BPM | OXYGEN SATURATION: 99 % | TEMPERATURE: 98.6 F | SYSTOLIC BLOOD PRESSURE: 124 MMHG | DIASTOLIC BLOOD PRESSURE: 81 MMHG | RESPIRATION RATE: 14 BRPM

## 2023-10-24 DIAGNOSIS — J06.9 URI (UPPER RESPIRATORY INFECTION): ICD-10-CM

## 2023-10-24 DIAGNOSIS — J02.9 VIRAL PHARYNGITIS: Primary | ICD-10-CM

## 2023-10-24 PROCEDURE — 99282 EMERGENCY DEPT VISIT SF MDM: CPT

## 2023-10-24 PROCEDURE — 99284 EMERGENCY DEPT VISIT MOD MDM: CPT | Performed by: EMERGENCY MEDICINE

## 2023-10-24 PROCEDURE — 96372 THER/PROPH/DIAG INJ SC/IM: CPT

## 2023-10-24 RX ORDER — KETOROLAC TROMETHAMINE 30 MG/ML
15 INJECTION, SOLUTION INTRAMUSCULAR; INTRAVENOUS ONCE
Status: COMPLETED | OUTPATIENT
Start: 2023-10-24 | End: 2023-10-24

## 2023-10-24 RX ADMIN — KETOROLAC TROMETHAMINE 15 MG: 30 INJECTION, SOLUTION INTRAMUSCULAR; INTRAVENOUS at 15:56

## 2023-10-24 RX ADMIN — DEXAMETHASONE 6 MG: 2 TABLET ORAL at 15:56

## 2023-10-24 NOTE — ED ATTENDING ATTESTATION
10/24/2023  Naomy Franks DO, saw and evaluated the patient. I have discussed the patient with the resident/non-physician practitioner and agree with the resident's/non-physician practitioner's findings, Plan of Care, and MDM as documented in the resident's/non-physician practitioner's note, except where noted. All available labs and Radiology studies were reviewed. I was present for key portions of any procedure(s) performed by the resident/non-physician practitioner and I was immediately available to provide assistance. At this point I agree with the current assessment done in the Emergency Department. I have conducted an independent evaluation of this patient a history and physical is as follows:    27 yo female presents for evaluation of sore throat, dry cough. No fever, HA, neck pain/stiffness. No other c/o at this time. Mild posterior oropharynx erythema. No tonsillar exudates. Neck supple    Imp: pharyngitis likely viral plan: dex, ketorolac, OTC analgesics at home.       ED Course         Critical Care Time  Procedures

## 2023-10-24 NOTE — ED PROVIDER NOTES
History  Chief Complaint   Patient presents with    Sore Throat     Sore throat since last night. Pt also concerned that her ears are hot to the touch. Behind her ears are also peeling and she found "crust" there yesterday. Patient is a 70-year-old female presenting to the emergency department for evaluation of sore throat since last night. Patient notes that her ears have been itchy as well. She denies any allergies. Patient notes she is tolerating her secretions eating and drinking normally. Denies any fevers chills chest pain shortness of breath nausea vomiting diarrhea constipation dysuria hematuria. Denies any sick contacts. Prior to Admission Medications   Prescriptions Last Dose Informant Patient Reported? Taking? clotrimazole (Clotrimazole Anti-Fungal) 1 % cream   No No   Sig: Apply topically 2 (two) times a day   etonogestrel (Nexplanon) subdermal implant  Self Yes No   Si mg by Subdermal route once   fluticasone (FLONASE) 50 mcg/act nasal spray   No No   Si spray into each nostril daily   pseudoephedrine (SUDAFED) 120 MG 12 hr tablet   No No   Sig: Take 1 tablet (120 mg total) by mouth 2 (two) times a day      Facility-Administered Medications: None       No past medical history on file. Past Surgical History:   Procedure Laterality Date    WISDOM TOOTH EXTRACTION         Family History   Problem Relation Age of Onset    Diabetes Mother     No Known Problems Father      I have reviewed and agree with the history as documented. E-Cigarette/Vaping    E-Cigarette Use Current Some Day User      E-Cigarette/Vaping Substances    Nicotine Yes     Flavoring Yes      Social History     Tobacco Use    Smoking status: Never    Smokeless tobacco: Never   Vaping Use    Vaping Use: Some days    Substances: Nicotine, Flavoring   Substance Use Topics    Alcohol use: No    Drug use: No        Review of Systems   Constitutional:  Negative for activity change, chills, fatigue and fever. HENT:  Positive for sore throat. Negative for congestion, ear pain, trouble swallowing and voice change. Eyes:  Negative for pain and visual disturbance. Respiratory:  Negative for cough, chest tightness and shortness of breath. Cardiovascular:  Negative for chest pain and palpitations. Gastrointestinal:  Negative for abdominal pain, constipation, diarrhea, nausea and vomiting. Genitourinary:  Negative for dysuria and hematuria. Musculoskeletal:  Negative for arthralgias and back pain. Skin:  Negative for color change and rash. Neurological:  Negative for dizziness, seizures, syncope, weakness, light-headedness, numbness and headaches. Psychiatric/Behavioral:  Negative for agitation and behavioral problems. All other systems reviewed and are negative. Physical Exam  ED Triage Vitals [10/24/23 1540]   Temperature Pulse Respirations Blood Pressure SpO2   98.6 °F (37 °C) 92 14 124/81 99 %      Temp Source Heart Rate Source Patient Position - Orthostatic VS BP Location FiO2 (%)   Oral Monitor Sitting Left arm --      Pain Score       5             Orthostatic Vital Signs  Vitals:    10/24/23 1540   BP: 124/81   Pulse: 92   Patient Position - Orthostatic VS: Sitting       Physical Exam  Vitals and nursing note reviewed. Constitutional:       General: She is not in acute distress. Appearance: She is well-developed. HENT:      Head: Normocephalic and atraumatic. Right Ear: Ear canal normal. Tympanic membrane is not erythematous. Left Ear: Ear canal normal. Tympanic membrane is not erythematous. Ears:      Comments: Dull TMs bilaterally     Mouth/Throat:      Mouth: Mucous membranes are moist.      Tonsils: No tonsillar exudate. 2+ on the right. 2+ on the left. Comments: Posterior oropharynx cobblestoning  Eyes:      Conjunctiva/sclera: Conjunctivae normal.   Cardiovascular:      Rate and Rhythm: Normal rate and regular rhythm.       Heart sounds: No murmur heard.  Pulmonary:      Effort: Pulmonary effort is normal. No respiratory distress. Breath sounds: Normal breath sounds. Abdominal:      Palpations: Abdomen is soft. Tenderness: There is no abdominal tenderness. Musculoskeletal:         General: No swelling. Cervical back: Normal range of motion and neck supple. Skin:     General: Skin is warm and dry. Capillary Refill: Capillary refill takes less than 2 seconds. Neurological:      General: No focal deficit present. Mental Status: She is alert and oriented to person, place, and time. Psychiatric:         Mood and Affect: Mood normal.         ED Medications  Medications - No data to display    Diagnostic Studies  Results Reviewed       None                   No orders to display         Procedures  Procedures      ED Course                             SBIRT 20yo+      Flowsheet Row Most Recent Value   Initial Alcohol Screen: US AUDIT-C     1. How often do you have a drink containing alcohol? 0 Filed at: 10/24/2023 1544   2. How many drinks containing alcohol do you have on a typical day you are drinking? 0 Filed at: 10/24/2023 1544   3a. Male UNDER 65: How often do you have five or more drinks on one occasion? 0 Filed at: 10/24/2023 1544   3b. FEMALE Any Age, or MALE 65+: How often do you have 4 or more drinks on one occassion? 0 Filed at: 10/24/2023 1544   Audit-C Score 0 Filed at: 10/24/2023 1544   MIKAL: How many times in the past year have you. .. Used an illegal drug or used a prescription medication for non-medical reasons? Never Filed at: 10/24/2023 1544                  Medical Decision Making  42-year-old female presenting to the emergency department likely viral URI-like symptoms. Well-appearing nontoxic-appearing. Patient with posterior oral pharynx cobblestoning erythema with bilateral dull TMs. She is nontoxic-appearing vitals within normal limits. Advised to continue Tylenol Motrin for symptomatic relief.   We will treat symptomatically with a dose of Decadron as well as Toradol. Advised to continue over-the-counter medications at home. Advised to follow-up with primary care in a few days for reevaluation patient is aware she has no questions concerns stable for discharge. Disposition  Final diagnoses:   None     ED Disposition       None          Follow-up Information    None         Patient's Medications   Discharge Prescriptions    No medications on file     No discharge procedures on file. PDMP Review       None             ED Provider  Attending physically available and evaluated Salomón Gamboa. I managed the patient along with the ED Attending.     Electronically Signed by           Marcos Camacho DO  10/24/23 9714

## 2024-12-11 ENCOUNTER — OFFICE VISIT (OUTPATIENT)
Dept: DENTISTRY | Facility: CLINIC | Age: 24
End: 2024-12-11

## 2024-12-11 DIAGNOSIS — Z01.21 ENCOUNTER FOR DENTAL EXAMINATION AND CLEANING WITH ABNORMAL FINDINGS: Primary | ICD-10-CM

## 2024-12-11 PROCEDURE — D0150 COMPREHENSIVE ORAL EVALUATION - NEW OR ESTABLISHED PATIENT: HCPCS

## 2024-12-11 PROCEDURE — D0210 INTRAORAL - COMPLETE SERIES OF RADIOGRAPHIC IMAGES: HCPCS

## 2024-12-11 NOTE — PROGRESS NOTES
Procedure Details   - COMPREHENSIVE ORAL EVALUATION - NEW OR ESTABLISHED PATIENT     COMP EXAM, FMX, PROBE EXAM   REVIEWED MED HX: meds, allergies, health changes reviewed in EPIC  CHIEF CONCERN:  Nothing at this time   -Last dental visit was 2 yrs. ago  PAIN SCALE:  0  ASA CLASS:  ASA 1 - Normal health patient  PLAQUE:  mild  CALCULUS: Localized  Light  Lower anteriors    PERIO: Patient is returning for prophy    Visual and Tactile Intraoral/ Extraoral evaluation: Oral and Oropharyngeal cancer evaluation. No findings      -  Reviewed with patient clinical and radiographic findings and patient verbalized understanding. All questions and concerns addressed.     REFERRALS: None    CARIES FINDINGS: #31-O       NEXT VISIT:   1) Prophy  NV2: #31-O resin    Last FMX : 12/11/2024   - INTRAORAL - COMPLETE SERIES OF RADIOGRAPHIC IMAGES

## 2024-12-11 NOTE — DENTAL PROCEDURE DETAILS
COMP EXAM, FMX, PROBE EXAM   REVIEWED MED HX: meds, allergies, health changes reviewed in EPIC  CHIEF CONCERN:  Nothing at this time   -Last dental visit was 2 yrs. ago  PAIN SCALE:  0  ASA CLASS:  ASA 1 - Normal health patient  PLAQUE:  mild  CALCULUS: Localized  Light  Lower anteriors    PERIO: Patient is returning for prophy    Visual and Tactile Intraoral/ Extraoral evaluation: Oral and Oropharyngeal cancer evaluation. No findings      -  Reviewed with patient clinical and radiographic findings and patient verbalized understanding. All questions and concerns addressed.     REFERRALS: None    CARIES FINDINGS: #31-O       NEXT VISIT:   1) Prophy  NV2: #31-O resin    Last FMX : 12/11/2024

## 2025-03-05 ENCOUNTER — NURSE TRIAGE (OUTPATIENT)
Age: 25
End: 2025-03-05

## 2025-03-05 NOTE — TELEPHONE ENCOUNTER
"FOLLOW UP: Attempted to contact clerical, however unable to reach office staff. Routing to office clerical to please follow-up with patient. Patient wondering if she will need to pay up-front for visit due to being self-pay. Thank you!     REASON FOR CONVERSATION: Vaginitis    SYMPTOMS: Foul odor, abnormal vaginal discharge, moderate itching    OTHER: Patient reports ongoing BV symptoms for past 2 months, has worsened in the last 2 weeks. Patient states she was treated for BV last year, but never finished treatment due to abdominal cramping. Patient seeking appointment. She is self pay with no insurance. Phone number given to [employer], however appointment tentatively scheduled 3/10 at preferred office per patient request. Patient would like to know if she would need to pay up front for appointment.    DISPOSITION: See Within 3 Days in Office      Reason for Disposition   Vaginal odor (bad smell) not improved > 3 days following Care Advice    Answer Assessment - Initial Assessment Questions  1. SYMPTOM: \"What's the main symptom you're concerned about?\" (e.g., pain, itching, dryness)      Vaginal itching, foul odor, discharge  2. LOCATION: \"Where is the  s/s located?\" (e.g., inside/outside, left/right)      Internal vaginal area  3. ONSET: \"When did the  s/s  start?\"      2 months ago, worsened past 2 weeks  4. PAIN: \"Is there any pain?\" If Yes, ask: \"How bad is it?\" (Scale: 1-10; mild, moderate, severe)      Denies   5. ITCHING: \"Is there any itching?\" If Yes, ask: \"How bad is it?\" (Scale: 1-10; mild, moderate, severe)      Moderate  6. CAUSE: \"What do you think is causing the discharge?\" \"Have you had the same problem before?\" \"What happened then?\"      BV   7. OTHER SYMPTOMS: \"Do you have any other symptoms?\" (e.g., fever, itching, vaginal bleeding, pain with urination, injury to genital area, vaginal foreign body)      Foul odor, thick clumpy discharge, excessive at times. Sometimes watery, creamy white " "color.   8. PREGNANCY: \"Is there any chance you are pregnant?\" \"When was your last menstrual period?\"      03/05/25    Protocols used: Vaginal Symptoms-Adult-[place]    "

## 2025-03-06 NOTE — TELEPHONE ENCOUNTER
Left voice mail for patient regarding self pay estimate for visit.  Approximate amount for office visit is $111.00, expectation is that amount to be paid, if unable to pay in full a minimum of $30 deposit is requires.  Balance will be billed.  There may also be laboratory charges which will be billed separately. Patient is part of [facility] [employer] and that may be more cost effective for her, but will can also be seen here.

## 2025-06-13 ENCOUNTER — OFFICE VISIT (OUTPATIENT)
Dept: OBGYN CLINIC | Facility: CLINIC | Age: 25
End: 2025-06-13

## 2025-06-13 VITALS
HEART RATE: 73 BPM | BODY MASS INDEX: 33.12 KG/M2 | HEIGHT: 65 IN | DIASTOLIC BLOOD PRESSURE: 76 MMHG | SYSTOLIC BLOOD PRESSURE: 112 MMHG | RESPIRATION RATE: 18 BRPM | WEIGHT: 198.8 LBS

## 2025-06-13 DIAGNOSIS — Z11.3 SCREEN FOR STD (SEXUALLY TRANSMITTED DISEASE): ICD-10-CM

## 2025-06-13 DIAGNOSIS — Z30.46 NEXPLANON REMOVAL: Primary | ICD-10-CM

## 2025-06-13 PROCEDURE — 87491 CHLMYD TRACH DNA AMP PROBE: CPT | Performed by: NURSE PRACTITIONER

## 2025-06-13 PROCEDURE — 11982 REMOVE DRUG IMPLANT DEVICE: CPT | Performed by: NURSE PRACTITIONER

## 2025-06-13 PROCEDURE — 87591 N.GONORRHOEAE DNA AMP PROB: CPT | Performed by: NURSE PRACTITIONER

## 2025-06-13 NOTE — PROGRESS NOTES
Remove and insert drug implant    Date/Time: 6/13/2025 1:00 PM    Performed by: JACOB Clinton  Authorized by: JACOB Clinton    Consent:       Consent obtained:  Verbal and written      Consent given by:  Patient      Procedural risks discussed:  Bleeding and infection      Patient questions answered:  yes       Patient agrees, verbalizes understanding, and wants to proceed:  yes    Indication:       Indication:  presence of non-biodegradable drug delivery implant    Pre-procedure:       Pre-procedure timeout performed:  yes       Prepped with:  povidone-iodine       Local anesthetic:  Lidocaine with epinephrine      The site was cleaned and prepped in a sterile fashion:  yes    Procedure:       Procedure:  Removal      Left/right:  Left      Small stab incision was made in arm:  yes       The tip of the implant emerged from the skin incision:  Yes       The implant was grasped with forceps:  Yes       The implant was removed  :  Yes       Site was closed and/or dressed with:  Steri-strips, band-aid and ace bandage      Patient tolerated procedure well:  Patient tolerated procedure well    Comments:       Patient presents today for removal of Nexplanon due to expiration of devise. She is not currently sexually active and declines contraception at this. STI culture collected via urine, STI blood panel ordered. Will follow up for annual exam or sooner if needed.

## 2025-06-16 ENCOUNTER — RESULTS FOLLOW-UP (OUTPATIENT)
Dept: OBGYN CLINIC | Facility: CLINIC | Age: 25
End: 2025-06-16

## 2025-06-16 LAB
C TRACH DNA SPEC QL NAA+PROBE: NEGATIVE
N GONORRHOEA DNA SPEC QL NAA+PROBE: NEGATIVE

## 2025-08-18 ENCOUNTER — APPOINTMENT (OUTPATIENT)
Dept: URGENT CARE | Facility: MEDICAL CENTER | Age: 25
End: 2025-08-18
Payer: OTHER MISCELLANEOUS

## 2025-08-18 ENCOUNTER — APPOINTMENT (OUTPATIENT)
Dept: LAB | Facility: MEDICAL CENTER | Age: 25
End: 2025-08-18
Payer: OTHER MISCELLANEOUS

## 2025-08-18 DIAGNOSIS — Z77.21 EXPOSURE TO BLOOD-BORNE PATHOGEN: ICD-10-CM

## 2025-08-18 LAB — ALT SERPL W P-5'-P-CCNC: 11 U/L (ref 7–52)

## 2025-08-18 PROCEDURE — 87389 HIV-1 AG W/HIV-1&-2 AB AG IA: CPT

## 2025-08-18 PROCEDURE — 99284 EMERGENCY DEPT VISIT MOD MDM: CPT

## 2025-08-18 PROCEDURE — 86706 HEP B SURFACE ANTIBODY: CPT

## 2025-08-18 PROCEDURE — 84460 ALANINE AMINO (ALT) (SGPT): CPT

## 2025-08-18 PROCEDURE — 36415 COLL VENOUS BLD VENIPUNCTURE: CPT

## 2025-08-18 PROCEDURE — 86803 HEPATITIS C AB TEST: CPT

## 2025-08-18 PROCEDURE — G0383 LEV 4 HOSP TYPE B ED VISIT: HCPCS

## 2025-08-18 PROCEDURE — 87340 HEPATITIS B SURFACE AG IA: CPT

## 2025-08-19 LAB
HBV SURFACE AB SER-ACNC: 22.7 MIU/ML
HBV SURFACE AG SER QL: NORMAL
HCV AB SER QL: NORMAL
HIV 1+2 AB+HIV1 P24 AG SERPL QL IA: NORMAL